# Patient Record
(demographics unavailable — no encounter records)

---

## 2024-11-08 NOTE — CONSULT LETTER
[Dear  ___] : Dear  [unfilled], [Courtesy Letter:] : I had the pleasure of seeing your patient, [unfilled], in my office today. [( Thank you for referring [unfilled] for consultation for _____ )] : Thank you for referring [unfilled] for consultation for [unfilled] [Please see my note below.] : Please see my note below. [Consult Closing:] : Thank you very much for allowing me to participate in the care of this patient.  If you have any questions, please do not hesitate to contact me. [Sincerely,] : Sincerely, [FreeTextEntry3] : Jose Agustin MD, FICS, FACS Director of Surgical Oncology- [facility] , Department of Surgery [facility] at [facility]  [address] [place], [place]- [address]  [address] [place], [place]- [address]  (mob) [phone] (o) [phone] (f) [phone]

## 2024-11-08 NOTE — REASON FOR VISIT
[Follow-Up Visit] : a follow-up visit for [Family Member] : family member [FreeTextEntry2] : Pancreatic lesion

## 2024-11-08 NOTE — PHYSICAL EXAM
[FreeTextEntry1] :  COVID -19 precautions as per [facility] policy was universally followed. [Normal] : supple, no neck mass and thyroid not enlarged [Normal Neck Lymph Nodes] : normal neck lymph nodes  [Normal Supraclavicular Lymph Nodes] : normal supraclavicular lymph nodes [Normal Groin Lymph Nodes] : normal groin lymph nodes [Normal Axillary Lymph Nodes] : normal axillary lymph nodes [Normal] : oriented to person, place and time, with appropriate affect [de-identified] : Abdomen soft, non-tender, non-distended, and no palpable masses.

## 2024-11-08 NOTE — HISTORY OF PRESENT ILLNESS
[de-identified] : The patient is a Stateless speaking 79 year F (Office Ma translated) referred by Dr. Noah Hansen for consultation regarding pancreatic lesion, here for follow up visit.  PMH: HTN, hemorrhoids, thrombocytopenia PSH: Cholecystectomy, heart pacemaker 2004, CLEVE/BSO Meds: Eliquis FH: Mom with unknown liver disease. Breast cancer, sister. Denies a family history of colon CA, gastric CA, high risk polyps, Inflammatory and autoimmune disease.   Patient is following Dr. Hernandez for abdominal discomfort and incidentally found to have a 2cm pancreas head cyst on a CT chest scan done at [facility] (4/2023). 8/3/2023 EGD/EUS with Dr. Hansen showed one 3 cm pancreatic head cyst. Patho: Gastric polyp, biopsy: Hyperplastic gastric polyp with acute and chronic inflammation. Cresyl violet stain for H. pylori is negative. Pancreas head FNA: Atypical findings; cytology slide is essentially acellular with scattered mucine like debris and histiocytes. The possibility of mucinous lesion cannot be ruled out.  9/6/2023: Currently patient denies abdominal pain, nausea, vomiting, fevers/chills. She was having diarrhea which was exacerbated with red meat, however after increasing vegetable consumption, BMs have improved. Dairy causes constipation. She has daily BMs without straining. Last colonoscopy 7 years ago reportedly normal.  Pancreatic mass has been gradually increasing in size. CT 1.7cm (7/18/2022), CT 2 cm (4/14/2023), EGD/EUS 3cm (8/3/2023).   10/6/2023 CT A/P ([facility]): 2.1 cm pancreatic cyst, most likely a side branch IPMN without worrisome features. Recommend follow-up CT abdomen with and without contrast in 6-12 months. If the pacemaker is compatible, MRI/MRCP abdomen with and without pelvis can be obtained.  5/15/24: Patient is doing well. Denies abdominal pain, nausea, vomiting, fevers/chills.   11/19/2024 MRCP   Cardiologist: Dr. Alvarado ([phone])

## 2024-11-08 NOTE — ASSESSMENT
[FreeTextEntry1] : IMP: 79 year old female presenting with a pancreatic cyst consistent with suspected IPMN w/o worrisome features  10/6/2023 CT A/P ([facility]): 2.1 cm pancreatic cyst, most likely a side branch IPMN without worrisome features. Recommend follow-up CT abdomen with and without contrast in 6-12 months. If the pacemaker is compatible, MRI/MRCP abdomen with and without pelvis can be obtained.  9/21/2023: Elastase 607 (WNL), patient did not bring rest of lab work results.   PLAN: - Stable cyst - Follow up MRCP in 6 months - RTO after imaging   I have discussed the diagnosis, therapeutic plan and options with the patient at length. Patient expressed verbal understanding to proceed with proposed plan. All questions answered.

## 2024-11-14 NOTE — CONSULT LETTER
[FreeTextEntry3] : Jose Agustin MD, FICS, FACS Director of Surgical Oncology- [facility] , Department of Surgery [facility] at [facility]  [address] [place], [place]- [address]  [address] [place]- [address]  (mob) [phone] (o) [phone] (f) [phone]

## 2024-11-14 NOTE — PHYSICAL EXAM
[FreeTextEntry1] :  COVID -19 precautions as per [facility] policy was universally followed. [de-identified] : Abdomen soft, non-tender, non-distended, and no palpable masses.

## 2024-11-14 NOTE — HISTORY OF PRESENT ILLNESS
[de-identified] : The patient is a Eritrean speaking 79 year F (Office Ma translated) referred by Dr. Noah Hansen for consultation regarding pancreatic lesion, here for follow up visit.  PMH: HTN, hemorrhoids, thrombocytopenia PSH: Cholecystectomy, heart pacemaker 2004, CLEVE/BSO Meds: Eliquis FH: Mom with unknown liver disease. Breast cancer, sister. Denies a family history of colon CA, gastric CA, high risk polyps, Inflammatory and autoimmune disease.   Patient is following Dr. Hernandez for abdominal discomfort and incidentally found to have a 2cm pancreas head cyst on a CT chest scan done at [facility] (4/2023). 8/3/2023 EGD/EUS with Dr. Hansen showed one 3 cm pancreatic head cyst. Patho: Gastric polyp, biopsy: Hyperplastic gastric polyp with acute and chronic inflammation. Cresyl violet stain for H. pylori is negative. Pancreas head FNA: Atypical findings; cytology slide is essentially acellular with scattered mucine like debris and histiocytes. The possibility of mucinous lesion cannot be ruled out.  9/6/2023: Currently patient denies abdominal pain, nausea, vomiting, fevers/chills. She was having diarrhea which was exacerbated with red meat, however after increasing vegetable consumption, BMs have improved. Dairy causes constipation. She has daily BMs without straining. Last colonoscopy 7 years ago reportedly normal.  Pancreatic mass has been gradually increasing in size. CT 1.7cm (7/18/2022), CT 2 cm (4/14/2023), EGD/EUS 3cm (8/3/2023).   10/6/2023 CT A/P ([facility]): 2.1 cm pancreatic cyst, most likely a side branch IPMN without worrisome features. Recommend follow-up CT abdomen with and without contrast in 6-12 months. If the pacemaker is compatible, MRI/MRCP abdomen with and without pelvis can be obtained.  5/15/24: Patient is doing well. Denies abdominal pain, nausea, vomiting, fevers/chills.   11/19/2024 MRCP   Cardiologist: Dr. Alvarado ([phone])